# Patient Record
Sex: MALE | Race: WHITE
[De-identification: names, ages, dates, MRNs, and addresses within clinical notes are randomized per-mention and may not be internally consistent; named-entity substitution may affect disease eponyms.]

---

## 2018-07-28 ENCOUNTER — HOSPITAL ENCOUNTER (INPATIENT)
Dept: HOSPITAL 5 - ED | Age: 28
LOS: 2 days | Discharge: HOME | DRG: 683 | End: 2018-07-30
Attending: INTERNAL MEDICINE | Admitting: INTERNAL MEDICINE
Payer: COMMERCIAL

## 2018-07-28 DIAGNOSIS — E87.1: ICD-10-CM

## 2018-07-28 DIAGNOSIS — D75.1: ICD-10-CM

## 2018-07-28 DIAGNOSIS — F12.90: ICD-10-CM

## 2018-07-28 DIAGNOSIS — F17.210: ICD-10-CM

## 2018-07-28 DIAGNOSIS — K56.600: ICD-10-CM

## 2018-07-28 DIAGNOSIS — I95.9: ICD-10-CM

## 2018-07-28 DIAGNOSIS — E83.52: ICD-10-CM

## 2018-07-28 DIAGNOSIS — E87.5: ICD-10-CM

## 2018-07-28 DIAGNOSIS — N17.9: Primary | ICD-10-CM

## 2018-07-28 DIAGNOSIS — Z82.49: ICD-10-CM

## 2018-07-28 DIAGNOSIS — Z93.3: ICD-10-CM

## 2018-07-28 LAB
ALBUMIN SERPL-MCNC: 5.8 G/DL (ref 3.9–5)
ALT SERPL-CCNC: 64 UNITS/L (ref 7–56)
BASOPHILS # (AUTO): 0 K/MM3 (ref 0–0.1)
BASOPHILS NFR BLD AUTO: 0.4 % (ref 0–1.8)
BUN SERPL-MCNC: 27 MG/DL (ref 9–20)
BUN/CREAT SERPL: 8 %
CALCIUM SERPL-MCNC: 11.9 MG/DL (ref 8.4–10.2)
EOSINOPHIL # BLD AUTO: 0 K/MM3 (ref 0–0.4)
EOSINOPHIL NFR BLD AUTO: 0.2 % (ref 0–4.3)
HCT VFR BLD CALC: 60 % (ref 35.5–45.6)
HEMOLYSIS INDEX: 185
HGB BLD-MCNC: 20.2 GM/DL (ref 11.8–15.2)
LIPASE SERPL-CCNC: 48 UNITS/L (ref 13–60)
LYMPHOCYTES # BLD AUTO: 2 K/MM3 (ref 1.2–5.4)
LYMPHOCYTES NFR BLD AUTO: 18.2 % (ref 13.4–35)
MCH RBC QN AUTO: 30 PG (ref 28–32)
MCHC RBC AUTO-ENTMCNC: 34 % (ref 32–34)
MCV RBC AUTO: 89 FL (ref 84–94)
MONOCYTES # (AUTO): 1 K/MM3 (ref 0–0.8)
MONOCYTES % (AUTO): 9.1 % (ref 0–7.3)
PLATELET # BLD: 429 K/MM3 (ref 140–440)
RBC # BLD AUTO: 6.75 M/MM3 (ref 3.65–5.03)

## 2018-07-28 PROCEDURE — 93005 ELECTROCARDIOGRAM TRACING: CPT

## 2018-07-28 PROCEDURE — 84520 ASSAY OF UREA NITROGEN: CPT

## 2018-07-28 PROCEDURE — 74018 RADEX ABDOMEN 1 VIEW: CPT

## 2018-07-28 PROCEDURE — 36415 COLL VENOUS BLD VENIPUNCTURE: CPT

## 2018-07-28 PROCEDURE — 74019 RADEX ABDOMEN 2 VIEWS: CPT

## 2018-07-28 PROCEDURE — 85025 COMPLETE CBC W/AUTO DIFF WBC: CPT

## 2018-07-28 PROCEDURE — 82565 ASSAY OF CREATININE: CPT

## 2018-07-28 PROCEDURE — 80048 BASIC METABOLIC PNL TOTAL CA: CPT

## 2018-07-28 PROCEDURE — 86850 RBC ANTIBODY SCREEN: CPT

## 2018-07-28 PROCEDURE — 82150 ASSAY OF AMYLASE: CPT

## 2018-07-28 PROCEDURE — 0D9670Z DRAINAGE OF STOMACH WITH DRAINAGE DEVICE, VIA NATURAL OR ARTIFICIAL OPENING: ICD-10-PCS | Performed by: EMERGENCY MEDICINE

## 2018-07-28 PROCEDURE — 86900 BLOOD TYPING SEROLOGIC ABO: CPT

## 2018-07-28 PROCEDURE — 93010 ELECTROCARDIOGRAM REPORT: CPT

## 2018-07-28 PROCEDURE — 85018 HEMOGLOBIN: CPT

## 2018-07-28 PROCEDURE — 80053 COMPREHEN METABOLIC PANEL: CPT

## 2018-07-28 PROCEDURE — 74176 CT ABD & PELVIS W/O CONTRAST: CPT

## 2018-07-28 PROCEDURE — 85014 HEMATOCRIT: CPT

## 2018-07-28 PROCEDURE — 86901 BLOOD TYPING SEROLOGIC RH(D): CPT

## 2018-07-28 PROCEDURE — 83690 ASSAY OF LIPASE: CPT

## 2018-07-28 PROCEDURE — 84484 ASSAY OF TROPONIN QUANT: CPT

## 2018-07-28 PROCEDURE — 85027 COMPLETE CBC AUTOMATED: CPT
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
## 2018-07-29 LAB
BASOPHILS # (AUTO): 0 K/MM3 (ref 0–0.1)
BASOPHILS NFR BLD AUTO: 0.4 % (ref 0–1.8)
BUN SERPL-MCNC: 10 MG/DL (ref 9–20)
BUN SERPL-MCNC: 15 MG/DL (ref 9–20)
BUN/CREAT SERPL: 15 %
CALCIUM SERPL-MCNC: 7.8 MG/DL (ref 8.4–10.2)
EOSINOPHIL # BLD AUTO: 0.1 K/MM3 (ref 0–0.4)
EOSINOPHIL NFR BLD AUTO: 0.8 % (ref 0–4.3)
HCT VFR BLD CALC: 41.2 % (ref 35.5–45.6)
HCT VFR BLD CALC: 41.4 % (ref 35.5–45.6)
HEMOLYSIS INDEX: 17
HGB BLD-MCNC: 13.9 GM/DL (ref 11.8–15.2)
HGB BLD-MCNC: 13.9 GM/DL (ref 11.8–15.2)
LYMPHOCYTES # BLD AUTO: 1.5 K/MM3 (ref 1.2–5.4)
LYMPHOCYTES NFR BLD AUTO: 21.7 % (ref 13.4–35)
MCH RBC QN AUTO: 30 PG (ref 28–32)
MCHC RBC AUTO-ENTMCNC: 34 % (ref 32–34)
MCV RBC AUTO: 89 FL (ref 84–94)
MONOCYTES # (AUTO): 0.7 K/MM3 (ref 0–0.8)
MONOCYTES % (AUTO): 9.5 % (ref 0–7.3)
PLATELET # BLD: 253 K/MM3 (ref 140–440)
RBC # BLD AUTO: 4.64 M/MM3 (ref 3.65–5.03)

## 2018-07-29 RX ADMIN — SODIUM CHLORIDE SCH MLS/HR: 0.9 INJECTION, SOLUTION INTRAVENOUS at 09:14

## 2018-07-29 RX ADMIN — ENOXAPARIN SODIUM SCH MG: 100 INJECTION SUBCUTANEOUS at 10:18

## 2018-07-29 RX ADMIN — SODIUM CHLORIDE SCH MLS/HR: 0.9 INJECTION, SOLUTION INTRAVENOUS at 00:45

## 2018-07-29 RX ADMIN — SODIUM CHLORIDE SCH MLS/HR: 0.9 INJECTION, SOLUTION INTRAVENOUS at 10:17

## 2018-07-29 RX ADMIN — Medication SCH ML: at 21:53

## 2018-07-29 RX ADMIN — Medication SCH ML: at 10:00

## 2018-07-29 RX ADMIN — SODIUM CHLORIDE SCH MLS/HR: 0.9 INJECTION, SOLUTION INTRAVENOUS at 06:38

## 2018-07-29 RX ADMIN — SODIUM CHLORIDE SCH MLS/HR: 0.9 INJECTION, SOLUTION INTRAVENOUS at 16:32

## 2018-07-29 RX ADMIN — SODIUM CHLORIDE SCH MLS/HR: 0.9 INJECTION, SOLUTION INTRAVENOUS at 18:26
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
## 2018-07-29 NOTE — PROGRESS NOTE
Assessment and Plan


Assessment and plan: 


27-year-old man with history of hepatitis C comes emergency room with 

complaints of nausea and vomiting 3 days.  Also complaining of abdominal pain 

in the left lower quadrant which she described as a tightness, constant, 

intensity 7/10, no radiation, cannot identify exacerbating or relieving 

factors.  Complains of feeling dizzy, no diarrhea








Partial small bowel obstruction


Acute renal failure secondary to prolonged nausea vomiting


Hyperkalemia


Hypotension


polycethemia


Hypercalcemia


Hyponatremia


Dehydration











Plan


Supportive care


Give additional 2 L bolus of fluids-still hypotensive. Give additional 2 liters


Surgery consulted


NGT to low intermittent suction


Placed on bowel rest, consult surgery, NG tube placement


Continue antiemetics,  monitor kidney function


DVT prophylaxis


Plan discussed with patient and also surgeon and nursing staff


Anticipate discharge in am








History


Interval history: 


Patient seen and examined today, reports some improvement at the time of my 

visit. mild dizziness but improved also. No other adverse event reported by 

nursing staff. 








Hospitalist Physical





- Constitutional


Vitals: 


 











Temp Pulse Resp BP Pulse Ox


 


 98.0 F   71   16   94/38   100 


 


 07/29/18 07:02  07/29/18 07:02  07/29/18 07:02  07/29/18 07:05  07/29/18 07:02











General appearance: Present: no acute distress, well-nourished





- EENT


Eyes: Present: PERRL, EOM intact





- Neck


Neck: Present: supple, normal ROM





- Respiratory


Respiratory effort: normal


Respiratory: bilateral: CTA





- Cardiovascular


Rhythm: regular


Heart Sounds: Present: S1 & S2.  Absent: systolic murmur





- Extremities


Extremities: no ischemia, pulses intact, pulses symmetrical, No edema, normal 

temperature, Full ROM


Peripheral Pulses: within normal limits





- Abdominal


General gastrointestinal: soft, non-tender, non-distended, hypoactive bowel 

sounds





- Integumentary


Integumentary: Present: warm (multiple tattoe), dry





- Psychiatric


Psychiatric: appropriate mood/affect, intact judgment & insight





- Neurologic


Neurologic: CNII-XII intact, moves all extremities





- Allied Health


Allied health notes reviewed: nursing





Results





- Labs


CBC & Chem 7: 


 07/29/18 17:54





 07/29/18 17:54


Labs: 


 Laboratory Last Values











WBC  11.0 K/mm3 (4.5-11.0)   07/28/18  15:36    


 


RBC  6.75 M/mm3 (3.65-5.03)  H  07/28/18  15:36    


 


Hgb  20.2 gm/dl (11.8-15.2)  H*  07/28/18  15:36    


 


Hct  60.0 % (35.5-45.6)  H  07/28/18  15:36    


 


MCV  89 fl (84-94)   07/28/18  15:36    


 


MCH  30 pg (28-32)   07/28/18  15:36    


 


MCHC  34 % (32-34)   07/28/18  15:36    


 


RDW  13.4 % (13.2-15.2)   07/28/18  15:36    


 


Plt Count  429 K/mm3 (140-440)   07/28/18  15:36    


 


Lymph % (Auto)  18.2 % (13.4-35.0)   07/28/18  15:36    


 


Mono % (Auto)  9.1 % (0.0-7.3)  H  07/28/18  15:36    


 


Eos % (Auto)  0.2 % (0.0-4.3)   07/28/18  15:36    


 


Baso % (Auto)  0.4 % (0.0-1.8)   07/28/18  15:36    


 


Lymph #  2.0 K/mm3 (1.2-5.4)   07/28/18  15:36    


 


Mono #  1.0 K/mm3 (0.0-0.8)  H  07/28/18  15:36    


 


Eos #  0.0 K/mm3 (0.0-0.4)   07/28/18  15:36    


 


Baso #  0.0 K/mm3 (0.0-0.1)   07/28/18  15:36    


 


Seg Neutrophils %  72.1 % (40.0-70.0)  H  07/28/18  15:36    


 


Seg Neutrophils #  7.9 K/mm3 (1.8-7.7)  H  07/28/18  15:36    


 


Sodium  132 mmol/L (137-145)  L  07/28/18  15:36    


 


Potassium  5.2 mmol/L (3.6-5.0)  H  07/28/18  15:36    


 


Chloride  80.4 mmol/L ()  L  07/28/18  15:36    


 


Carbon Dioxide  29 mmol/L (22-30)   07/28/18  15:36    


 


Anion Gap  28 mmol/L  07/28/18  15:36    


 


BUN  27 mg/dL (9-20)  H  07/28/18  15:36    


 


Creatinine  3.6 mg/dL (0.8-1.5)  H  07/28/18  15:36    


 


Estimated GFR  20 ml/min  07/28/18  15:36    


 


BUN/Creatinine Ratio  8 %  07/28/18  15:36    


 


Glucose  115 mg/dL ()  H  07/28/18  15:36    


 


Calcium  11.9 mg/dL (8.4-10.2)  H  07/28/18  15:36    


 


Total Bilirubin  0.80 mg/dL (0.1-1.2)   07/28/18  15:36    


 


AST  51 units/L (5-40)  H  07/28/18  15:36    


 


ALT  64 units/L (7-56)  H  07/28/18  15:36    


 


Alkaline Phosphatase  87 units/L ()   07/28/18  15:36    


 


Troponin T  < 0.010 ng/mL (0.00-0.029)   07/28/18  20:06    


 


Total Protein  10.5 g/dL (6.3-8.2)  H  07/28/18  15:36    


 


Albumin  5.8 g/dL (3.9-5)  H  07/28/18  15:36    


 


Albumin/Globulin Ratio  1.2 %  07/28/18  15:36    


 


Amylase  96 units/L ()   07/28/18  20:06    


 


Lipase  48 units/L (13-60)   07/28/18  15:36    














- Imaging and Cardiology


Abdominal x-ray: image reviewed (ngt)

## 2018-07-29 NOTE — XRAY REPORT
FINAL REPORT



EXAM: XR Abdomen 



CLINICAL INDICATIONS: ng tube placement



FINDINGS: 



Single supine view of the abdomen was acquired.  There is a

nasogastric tube which terminates in the gastric body.



Relatively little bowel gas is present.  There is a dilated loop

of what appears to be small bowel in the left hemipelvis,

measuring 3.2 cm.  No free air is seen.



IMPRESSION:



THE NASOGASTRIC TUBE TERMINATES IN THE GASTRIC BODY

## 2018-07-30 VITALS — SYSTOLIC BLOOD PRESSURE: 112 MMHG | DIASTOLIC BLOOD PRESSURE: 76 MMHG

## 2018-07-30 LAB
BUN SERPL-MCNC: 5 MG/DL (ref 9–20)
BUN/CREAT SERPL: 8 %
CALCIUM SERPL-MCNC: 8 MG/DL (ref 8.4–10.2)
HCT VFR BLD CALC: 40.1 % (ref 35.5–45.6)
HEMOLYSIS INDEX: 11
HGB BLD-MCNC: 13.8 GM/DL (ref 11.8–15.2)
MCH RBC QN AUTO: 30 PG (ref 28–32)
MCHC RBC AUTO-ENTMCNC: 34 % (ref 32–34)
MCV RBC AUTO: 88 FL (ref 84–94)
PLATELET # BLD: 253 K/MM3 (ref 140–440)
RBC # BLD AUTO: 4.58 M/MM3 (ref 3.65–5.03)

## 2018-07-30 RX ADMIN — ENOXAPARIN SODIUM SCH MG: 100 INJECTION SUBCUTANEOUS at 09:16

## 2018-07-30 RX ADMIN — SODIUM CHLORIDE SCH MLS/HR: 0.9 INJECTION, SOLUTION INTRAVENOUS at 00:28

## 2018-07-30 RX ADMIN — SODIUM CHLORIDE SCH MLS/HR: 0.9 INJECTION, SOLUTION INTRAVENOUS at 06:49

## 2018-07-30 NOTE — PROGRESS NOTE
Assessment and Plan





26 yo M with 





1. pSBO - resolved


2. severe dehydration secondary to n/v  - resolved


3. CHARLES - resolved


4. hypotension likely secondary to fluid depletion - resolved


5. hypokalemia 





Plan:


1. adv to soft diet


2. dc IVF


3. replace K


4. DVT ppx


5. OOB/ambulate


6. ok to dc home from surgery standpoint, pt has surgeon at [facility] to follow up 

with for colostomy reversal





Thank you for this consultation, please call with questions or concerns.





Subjective


Date of service: 07/30/18


Narrative: 





Pt seen and examined. Feels much better today. Tolerated diet today, no n/v, 

abd pain. Ostomy is functional.





Objective


 Vital Signs - 12hr











  07/30/18 07/30/18 07/30/18





  04:02 08:02 08:16


 


Temperature 97.8 F 97.9 F 


 


Pulse Rate 74 78 


 


Respiratory 16 18 





Rate   


 


Blood Pressure 100/47 112/76 


 


O2 Sat by Pulse 98 98 96





Oximetry   














- General physical appearance


Narrative Exam: 





Gen: AAox3. NAd


CV: s1, S2+


resp: even and unlabored


Abd: soft, NT, ND. ostomy with serous fluid in bag


Ext: no c/c/e





- Labs





 07/30/18 09:21





 07/30/18 09:21


 Diabetes panel











  07/29/18 07/30/18 Range/Units





  17:54 09:21 


 


Sodium   140  (137-145)  mmol/L


 


Potassium   3.4 L  (3.6-5.0)  mmol/L


 


Chloride   104.1  ()  mmol/L


 


Carbon Dioxide   27  (22-30)  mmol/L


 


BUN  10  5 L  (9-20)  mg/dL


 


Creatinine  0.7 L  0.6 L  (0.8-1.5)  mg/dL


 


Glucose   80  ()  mg/dL


 


Calcium   8.0 L  (8.4-10.2)  mg/dL








 Calcium panel











  07/30/18 Range/Units





  09:21 


 


Calcium  8.0 L  (8.4-10.2)  mg/dL








 Pituitary panel











  07/29/18 07/30/18 Range/Units





  17:54 09:21 


 


Sodium   140  (137-145)  mmol/L


 


Potassium   3.4 L  (3.6-5.0)  mmol/L


 


Chloride   104.1  ()  mmol/L


 


Carbon Dioxide   27  (22-30)  mmol/L


 


BUN  10  5 L  (9-20)  mg/dL


 


Creatinine  0.7 L  0.6 L  (0.8-1.5)  mg/dL


 


Glucose   80  ()  mg/dL


 


Calcium   8.0 L  (8.4-10.2)  mg/dL








 Adrenal panel











  07/29/18 07/30/18 Range/Units





  17:54 09:21 


 


Sodium   140  (137-145)  mmol/L


 


Potassium   3.4 L  (3.6-5.0)  mmol/L


 


Chloride   104.1  ()  mmol/L


 


Carbon Dioxide   27  (22-30)  mmol/L


 


BUN  10  5 L  (9-20)  mg/dL


 


Creatinine  0.7 L  0.6 L  (0.8-1.5)  mg/dL


 


Glucose   80  ()  mg/dL


 


Calcium   8.0 L  (8.4-10.2)  mg/dL

## 2018-07-30 NOTE — DISCHARGE SUMMARY
Providers





- Providers


Date of Admission: 


07/28/18 22:14





Attending physician: 


BIRGIT BRENNAN MD





 





07/28/18 21:45


Consult to Physician [CONS] Stat 


   Comment: COMPLETED - AMIE


   Consulting Provider: MAJOR SPEARS


   Physician Instructions: 


   Reason For Exam: Partial bowel obstruction











Primary care physician: 


PRIMARY CARE MD








Hospitalization


Condition: Stable


Hospital course: 





27-year-old man with history of hepatitis C comes emergency room with 

complaints of nausea and vomiting 3 days.  Also complaining of abdominal pain 

in the left lower quadrant which she described as a tightness, constant, 

intensity 7/10, no radiation, cannot identify exacerbating or relieving 

factors.  Complains of feeling dizzy, no diarrhea








Partial small bowel obstruction


Acute renal failure secondary to prolonged nausea vomiting


Hyperkalemia


Hypotension


polycethemia


Hypercalcemia


Hyponatremia


Dehydration


Disposition: DC-01 TO HOME OR SELFCARE


Time spent for discharge: 35 mins





Exam





- Constitutional


Vitals: 


 











Temp Pulse Resp BP Pulse Ox


 


 97.9 F   78   18   112/76   96 


 


 07/30/18 08:02  07/30/18 08:02  07/30/18 08:02  07/30/18 08:02  07/30/18 08:16














Plan


Activity: advance as tolerated, fall precautions


Diet: advance as tolerated


Special Instructions: smoking cessation


Additional Instructions: follow with primary surgeon in 3-5 days


Follow up with: 


PRIMARY CARE,MD [Primary Care Provider] - 3-5 Days